# Patient Record
Sex: MALE | Race: WHITE | NOT HISPANIC OR LATINO | URBAN - METROPOLITAN AREA
[De-identification: names, ages, dates, MRNs, and addresses within clinical notes are randomized per-mention and may not be internally consistent; named-entity substitution may affect disease eponyms.]

---
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
## 2025-05-05 ENCOUNTER — EMERGENCY (EMERGENCY)
Facility: HOSPITAL | Age: 25
LOS: 1 days | End: 2025-05-05
Attending: EMERGENCY MEDICINE
Payer: COMMERCIAL

## 2025-05-05 VITALS
SYSTOLIC BLOOD PRESSURE: 117 MMHG | DIASTOLIC BLOOD PRESSURE: 65 MMHG | WEIGHT: 182.98 LBS | TEMPERATURE: 99 F | HEART RATE: 111 BPM | RESPIRATION RATE: 18 BRPM | OXYGEN SATURATION: 96 %

## 2025-05-05 VITALS
OXYGEN SATURATION: 95 % | RESPIRATION RATE: 18 BRPM | SYSTOLIC BLOOD PRESSURE: 122 MMHG | TEMPERATURE: 98 F | DIASTOLIC BLOOD PRESSURE: 66 MMHG | HEART RATE: 74 BPM

## 2025-05-05 PROCEDURE — 82962 GLUCOSE BLOOD TEST: CPT

## 2025-05-05 PROCEDURE — 93010 ELECTROCARDIOGRAM REPORT: CPT

## 2025-05-05 PROCEDURE — 99285 EMERGENCY DEPT VISIT HI MDM: CPT

## 2025-05-05 PROCEDURE — 99283 EMERGENCY DEPT VISIT LOW MDM: CPT

## 2025-05-05 PROCEDURE — 93005 ELECTROCARDIOGRAM TRACING: CPT

## 2025-05-05 NOTE — ED ADULT NURSE NOTE - IN ACCORDANCE WITH [place] STATE LAW, WE OFFER EVERY PATIENT A HEPATITIS C TEST. WOULD YOU LIKE TO BE TESTED TODAY?
----- Message from Micaela Prater MA sent at 1/28/2022 11:58 AM CST -----    ----- Message -----  From: Maye Adair  Sent: 1/28/2022  11:22 AM CST  To: Samara Vasquez ([facility]) Staff    pt needs trulicity prior authorization called in to [facility], please call when sent..[phone]       
WILL WORK ON COMPLETING HER PA FOR HER TRULICITY AND LET HER KNOW WHEN INS REPLIES   
Yes, get tested

## 2025-05-05 NOTE — ED PROVIDER NOTE - OBJECTIVE STATEMENT
25-year-old male patient admits to inhaling marijuana via vaporizer pen at approximately 11:30 PM.  Patient states he subsequently felt anxious, weak and shaking.  Patient denies any falls or trauma.  No suicidal ideation.  4:45 AM patient is alert and oriented, clinically sober walking with normal gait and is asymptomatic.  Supportive mother in attendance.

## 2025-05-05 NOTE — ED PROVIDER NOTE - CLINICAL SUMMARY MEDICAL DECISION MAKING FREE TEXT BOX
5am   Patient remains clinically sober, supportive mother in attendance and will accompany patient home.  Patient advised not to use marijuana products.  Pt is well appearing, has no new complaints and able to walk with normal gait. Pt is stable for discharge and follow up with medical doctor. Pt educated on care and need for follow up. Discussed anticipatory guidance and return precautions. Questions answered. I had a detailed discussion with the patient regarding the historical points, exam findings, and any diagnostic results supporting the discharge diagnosis.

## 2025-05-05 NOTE — ED ADULT NURSE NOTE - OBJECTIVE STATEMENT
pt reports he had a panic attack x 11 pm. pt reports nothing triggered the panic attack, pt reports he just became anxious. pt denies SI/ HI. Pt denies chest pain, heart palpitations. pt reports he feels alittle bit better.

## 2025-05-05 NOTE — ED PROVIDER NOTE - PATIENT PORTAL LINK FT
You can access the FollowMyHealth Patient Portal offered by [facility] by registering at the following website: [email or web address]. By joining [facility] FollowMyHealth portal, you will also be able to view your health information using other applications (apps) compatible with our system.

## 2025-05-05 NOTE — ED PROVIDER NOTE - NSFOLLOWUPINSTRUCTIONS_ED_ALL_ED_FT
Marijuana is a mixture of the dried leaves and flowers of the hemp plant Cannabis sativa. The plant's active ingredients (cannabinoids) change the chemistry of the brain. If you smoke, vape, or eat marijuana, you will experience changes in the way you think, feel, and behave.    How can marijuana misuse affect me?  Marijuana affects you both mentally and physically. It can have negative effects, both short-term and long-term. Because your brain is still developing, it is more at risk than the adult brain to changes caused by drug use. When used for recreational purposes, marijuana is often used in high doses and for longer periods. High doses of marijuana, such as those in products called marijuana concentrates, can cause unpleasant side effects. It is also possible to become addicted to marijuana.    Short-term effects of marijuana use include:  Physical effects:  Increased heart rate.  Coughing.  Slowed movement, coordination, and reaction time.  Increased appetite.  Bloodshot eyes and changes in vision.  Mental effects:  Changes in mood and perception, such as an altered sense of time.  Poor memory and problem-solving ability.  Impaired judgement.  Drowsiness.  High doses of marijuana can cause:  Panic.  Anxiety.  Mental confusion.  Severe dizziness.  Vomiting.  Hallucinations. This means you see, hear, taste, smell, or feel things that are not real.  Using marijuana can affect your future by putting you at risk for:  Medical problems that will last your entire life.  Suspension from school and sports teams.  A criminal record that may limit your ability to get a good job or go to college.  What can increase my risk?  You may be at a higher risk of marijuana misuse if you:  Have a family history of drug addiction.  Often use other substances, like alcohol.  Begin to use marijuana at an early age.  Have had past issues with substance use disorder.  What can happen if I keep using marijuana?  If you use marijuana for a long time, it can have long-term effects such as:  Slowing of brain development.  Trouble maintaining healthy relationships.  Poor memory and difficulty learning. This can result in:  Decreased intelligence.  Poor performance at school or work.  An increased risk of dropping out of school.  Mental and physical dependence (addiction).  Higher risk of using other substances like alcohol, nicotine, and illegal drugs.  Hallucinations or temporary periods of false perceptions or beliefs (paranoia).  Worsening of mental illness or onset of new mental illness. This can include anxiety, depression, or suicidal thoughts.  Higher risk of health problems, such as:  Lung and breathing problems.  Possible higher risk of heart and cardiovascular problems.  A decrease in the body's disease-fighting system (immune system).  Long-term use can also lead to a condition called cannabinoid hyperemesis syndrome. This causes repeated episodes of intense abdominal pain, nausea, and vomiting.    What actions can I take to stop using marijuana?  A group of people taking part in a support group.  If you are not physically or mentally dependent on marijuana, you should be able to stop using it on your own. Taking these actions may help:  Find healthy ways to cope with stress, such as exercise, meditation, or spending time with family and friends.  Talk with your health care provider about how you feel and how to cope with stress.  Spend time with people who do not use drugs, or make new friends who do not use drugs.  Do something else instead of using marijuana. You can exercise, start a new hobby, or take part in group activities.  Do not be afraid to say no if someone offers you marijuana. Speak up about why you do not want to use drugs. You can be a positive role model for others.  If you cannot stop on your own, ask your health care provider for help. Treatment for marijuana addiction may include:  Cognitive-behavioral therapy (psychotherapy). This may include individual or group therapy.  Joining a support group.  Treating medical, behavioral, or mental health conditions that may exist along with the addiction.  What are the effects of vaping?  Vaping devices, such as e-cigarettes, may be used to smoke marijuana products. Vaping involves inhaling an aerosol or vapor made from a liquid or dry substance that is heated in the device. Vaping is not safe and is especially dangerous for you because your brain is still developing.    People who vape may also use marijuana concentrates. Vaping these products can result in more side effects than the use of plant marijuana, such as:  Inhaling toxic substances, including metals and volatile organic compounds (VOCs) from the devices and solvents used.  Inhaling heated air. This has been shown to burn lung tissue and cause lung diseases and lung cancer.  Increased risk of mental illness, such as psychosis, schizophrenia, anxiety, and paranoia.  Where to find support  For help finding treatment:  Substance Abuse and Mental Health Services Administration: samhsa.gov  [facility]: recovery.org  Where to find more information  [employer] [place] [employer] Drug Abuse: drugabuse.gov  U.S. Department of Health and Human Services: opa.hhs.gov  Recovered: recovered.org  Contact a health care provider if:  You want to stop using marijuana but you cannot.  You have symptoms when you try to stop using marijuana (withdrawal).  You are using marijuana every day or with other drugs.  You have anxiety or depression.  You have severe reactions, like hallucinations, delusions, or paranoia.  Marijuana use is interfering with your relationships or your ability to function at school or work.  Get help right away if:  You develop chest pain or shortness of breath.  You have severe abdominal pain, nausea, and vomiting.  You have fast or irregular heartbeats (palpitations).  These symptoms may be an emergency. Get help right away. Call 911.  Do not wait to see if the symptoms will go away.  Do not drive yourself to the hospital.  This information is not intended to replace advice given to you by your health care provider. Make sure you discuss any questions you have with your health care provider.

## 2025-05-05 NOTE — ED PROVIDER NOTE - NSFOLLOWUPCLINICS_GEN_ALL_ED_FT
[facility]  Internal Medicine  [address] [place] [address]  Phone: [phone]  Fax: [phone]